# Patient Record
Sex: MALE | Employment: FULL TIME | URBAN - METROPOLITAN AREA
[De-identification: names, ages, dates, MRNs, and addresses within clinical notes are randomized per-mention and may not be internally consistent; named-entity substitution may affect disease eponyms.]

---

## 2021-02-15 ENCOUNTER — NURSE TRIAGE (OUTPATIENT)
Dept: NURSING | Facility: CLINIC | Age: 31
End: 2021-02-15

## 2021-02-15 NOTE — TELEPHONE ENCOUNTER
Abrahan calls and says that he feels fluid in his head. Pt. Says that he feels this fluid in the sides and back of his head. Pt. Says that the inside of his ears also feels wet. Pt. Says that he has no fluid coming out of his ears. Pt. Says that this pressure began last Monday. Pt. Says that he has an appointment to see a  Tomorrow but wants to go to an  clinic, too. Pt. Says that he might go to the [facility] clinic this nanci. COVID 19 Nurse Triage Plan/Patient Instructions    Please be aware that novel coronavirus (COVID-19) may be circulating in the community. If you develop symptoms such as fever, cough, or SOB or if you have concerns about the presence of another infection including coronavirus (COVID-19), please contact your health care provider or visit [email or web address].     Disposition/Instructions    Home care recommended. Follow home care protocol based instructions.    Thank you for taking steps to prevent the spread of this virus.  o Limit your contact with others.  o Wear a simple mask to cover your cough.  o Wash your hands well and often.    Resources    [facility]: About COVID-19: [email or web address]    CDC: What to Do If You're Sick: www.cdc.gov/coronavirus/2019-ncov/about/steps-when-sick.html    CDC: Ending Home Isolation: www.cdc.gov/coronavirus/2019-ncov/hcp/disposition-in-home-patients.html     CDC: Caring for Someone: www.cdc.gov/coronavirus/2019-ncov/if-you-are-sick/care-for-someone.html     [facility]: Interim Guidance for Hospital Discharge to Home: www.health.[facility].mn.us/diseases/coronavirus/hcp/hospdischarge.pdf    [facility] clinical trials (COVID-19 research studies): [email or web address]     Below are the COVID-19 hotlines at the [facility] ([facility]). Interpreters are available.   o For health questions: Call 275-594-7971 or 1-193.582.9227 (7 a.m. to 7 p.m.)  o For questions about schools and childcare: Call 669-960-7630 or  "1-196.414.1009 (7 a.m. to 7 p.m.)                     Reason for Disposition    Headache    Additional Information    Negative: Difficult to awaken or acting confused (e.g., disoriented, slurred speech)    Negative: [1] Weakness of the face, arm or leg on one side of the body AND [2] new onset    Negative: [1] Numbness of the face, arm or leg on one side of the body AND [2] new onset    Negative: [1] Loss of speech or garbled speech AND [2] new onset    Negative: Passed out (i.e., lost consciousness, collapsed and was not responding)    Negative: Sounds like a life-threatening emergency to the triager    Negative: Followed a head injury    Negative: Pregnant    Negative: Postpartum (from 0 to 6 weeks after delivery)    Negative: Traumatic Brain Injury (TBI) is suspected    Negative: Unable to walk, or can only walk with assistance (e.g., requires support)    Negative: Stiff neck (can't touch chin to chest)    Negative: Severe pain in one eye    Negative: [1] Other family members (or roommates) with headaches AND [2] possibility of carbon monoxide exposure    Negative: [1] SEVERE headache (e.g., excruciating) AND [2] \"worst headache\" of life    Negative: [1] SEVERE headache AND [2] sudden-onset (i.e., reaching maximum intensity within seconds)    Negative: [1] SEVERE headache AND [2] fever    Negative: Loss of vision or double vision (Exception: same as prior migraines)    Negative: [1] Fever > 100.0 F (37.8 C) AND [2] diabetes mellitus or weak immune system (e.g., HIV positive, cancer chemo, splenectomy, organ transplant, chronic steroids)    Negative: Patient sounds very sick or weak to the triager    Negative: [1] SEVERE headache (e.g., excruciating) AND [2] not improved after 2 hours of pain medicine    Negative: [1] Vomiting AND [2] 2 or more times (Exception: similar to previous migraines)    Negative: Fever > 104 F (40 C)    Negative: [1] MODERATE headache (e.g., interferes with normal activities) AND [2] " present > 24 hours AND [3] unexplained  (Exceptions: analgesics not tried, typical migraine, or headache part of viral illness)    Negative: [1] New headache AND [2] weak immune system (e.g., HIV positive, cancer chemo, splenectomy, organ transplant, chronic steroids)    Negative: [1] New headache AND [2] age > 50    Negative: [1] Sinus pain of forehead AND [2] yellow or green nasal discharge    Negative: Fever present > 3 days (72 hours)    Negative: [1] MILD-MODERATE headache AND [2] present > 72 hours    Negative: Headache started during sex    Negative: Headache is a chronic symptom (recurrent or ongoing AND present > 4 weeks)    Negative: Similar to previously diagnosed migraine headaches    Negative: Similar to previously diagnosed muscle-tension headaches    Negative: [1] Headache AND [2] has not taken pain medications    Negative: [1] Headache AND [2] part of viral illness AND [3] present < 72 hours    Protocols used: HEADACHE-A-AH

## 2021-02-16 ENCOUNTER — OFFICE VISIT (OUTPATIENT)
Dept: URGENT CARE | Facility: URGENT CARE | Age: 31
End: 2021-02-16
Payer: COMMERCIAL

## 2021-02-16 ENCOUNTER — APPOINTMENT (OUTPATIENT)
Dept: CT IMAGING | Facility: CLINIC | Age: 31
End: 2021-02-16
Attending: EMERGENCY MEDICINE
Payer: COMMERCIAL

## 2021-02-16 ENCOUNTER — HOSPITAL ENCOUNTER (EMERGENCY)
Facility: CLINIC | Age: 31
Discharge: HOME | End: 2021-02-17
Attending: EMERGENCY MEDICINE | Admitting: EMERGENCY MEDICINE
Payer: COMMERCIAL

## 2021-02-16 ENCOUNTER — OFFICE VISIT (OUTPATIENT)
Dept: FAMILY MEDICINE | Facility: CLINIC | Age: 31
End: 2021-02-16
Payer: COMMERCIAL

## 2021-02-16 VITALS
HEART RATE: 120 BPM | WEIGHT: 220 LBS | TEMPERATURE: 99.7 F | OXYGEN SATURATION: 97 % | BODY MASS INDEX: 34.46 KG/M2 | SYSTOLIC BLOOD PRESSURE: 131 MMHG | DIASTOLIC BLOOD PRESSURE: 83 MMHG

## 2021-02-16 VITALS
OXYGEN SATURATION: 96 % | WEIGHT: 220 LBS | SYSTOLIC BLOOD PRESSURE: 119 MMHG | HEART RATE: 110 BPM | RESPIRATION RATE: 18 BRPM | DIASTOLIC BLOOD PRESSURE: 87 MMHG | TEMPERATURE: 99.7 F | HEIGHT: 67 IN | BODY MASS INDEX: 34.53 KG/M2

## 2021-02-16 DIAGNOSIS — R00.2 PALPITATIONS: Primary | ICD-10-CM

## 2021-02-16 DIAGNOSIS — Z11.52 ENCOUNTER FOR SCREENING LABORATORY TESTING FOR SEVERE ACUTE RESPIRATORY SYNDROME CORONAVIRUS 2 (SARS-COV-2): ICD-10-CM

## 2021-02-16 DIAGNOSIS — H65.91 OTITIS MEDIA WITH EFFUSION, RIGHT: Primary | ICD-10-CM

## 2021-02-16 DIAGNOSIS — H61.22 IMPACTED CERUMEN OF LEFT EAR: ICD-10-CM

## 2021-02-16 DIAGNOSIS — R00.0 SINUS TACHYCARDIA: ICD-10-CM

## 2021-02-16 DIAGNOSIS — H92.03 EAR DISCOMFORT, BILATERAL: ICD-10-CM

## 2021-02-16 DIAGNOSIS — R06.02 SOB (SHORTNESS OF BREATH): ICD-10-CM

## 2021-02-16 LAB
ALBUMIN SERPL-MCNC: 4.6 G/DL (ref 3.4–5)
ALBUMIN UR-MCNC: NEGATIVE MG/DL
ALP SERPL-CCNC: 48 U/L (ref 40–150)
ALT SERPL W P-5'-P-CCNC: 34 U/L (ref 0–70)
AMPHETAMINES UR QL SCN: NEGATIVE
ANION GAP SERPL CALCULATED.3IONS-SCNC: 6 MMOL/L (ref 3–14)
APPEARANCE UR: CLEAR
APTT PPP: 27 SEC (ref 22–37)
AST SERPL W P-5'-P-CCNC: 23 U/L (ref 0–45)
BARBITURATES UR QL: NEGATIVE
BASOPHILS # BLD AUTO: 0 10E9/L (ref 0–0.2)
BASOPHILS NFR BLD AUTO: 0.4 %
BENZODIAZ UR QL: NEGATIVE
BILIRUB SERPL-MCNC: 0.4 MG/DL (ref 0.2–1.3)
BILIRUB UR QL STRIP: NEGATIVE
BUN SERPL-MCNC: 15 MG/DL (ref 7–30)
CALCIUM SERPL-MCNC: 9.9 MG/DL (ref 8.5–10.1)
CANNABINOIDS UR QL SCN: POSITIVE
CHLORIDE SERPL-SCNC: 108 MMOL/L (ref 94–109)
CO2 SERPL-SCNC: 27 MMOL/L (ref 20–32)
COCAINE UR QL: NEGATIVE
COLOR UR AUTO: ABNORMAL
CREAT BLD-MCNC: 0.8 MG/DL (ref 0.66–1.25)
CREAT SERPL-MCNC: 1.16 MG/DL (ref 0.66–1.25)
DIFFERENTIAL METHOD BLD: NORMAL
EOSINOPHIL # BLD AUTO: 0 10E9/L (ref 0–0.7)
EOSINOPHIL NFR BLD AUTO: 0 %
ERYTHROCYTE [DISTWIDTH] IN BLOOD BY AUTOMATED COUNT: 13 % (ref 10–15)
ETHANOL UR QL SCN: NEGATIVE
FLUAV RNA RESP QL NAA+PROBE: NEGATIVE
FLUBV RNA RESP QL NAA+PROBE: NEGATIVE
GFR SERPL CREATININE-BSD FRML MDRD: 84 ML/MIN/{1.73_M2}
GFR SERPL CREATININE-BSD FRML MDRD: >90 ML/MIN/{1.73_M2}
GLUCOSE SERPL-MCNC: 108 MG/DL (ref 70–99)
GLUCOSE UR STRIP-MCNC: NEGATIVE MG/DL
HCT VFR BLD AUTO: 44.4 % (ref 40–53)
HGB BLD-MCNC: 15 G/DL (ref 13.3–17.7)
HGB UR QL STRIP: NEGATIVE
IMM GRANULOCYTES # BLD: 0 10E9/L (ref 0–0.4)
IMM GRANULOCYTES NFR BLD: 0.4 %
INR PPP: 1.1 (ref 0.86–1.14)
INTERPRETATION ECG - MUSE: NORMAL
KETONES UR STRIP-MCNC: 5 MG/DL
LABORATORY COMMENT REPORT: NORMAL
LACTATE BLD-SCNC: 2 MMOL/L (ref 0.7–2)
LEUKOCYTE ESTERASE UR QL STRIP: NEGATIVE
LYMPHOCYTES # BLD AUTO: 1.6 10E9/L (ref 0.8–5.3)
LYMPHOCYTES NFR BLD AUTO: 19.7 %
MAGNESIUM SERPL-MCNC: 1.8 MG/DL (ref 1.6–2.3)
MCH RBC QN AUTO: 29.4 PG (ref 26.5–33)
MCHC RBC AUTO-ENTMCNC: 33.8 G/DL (ref 31.5–36.5)
MCV RBC AUTO: 87 FL (ref 78–100)
MONOCYTES # BLD AUTO: 0.5 10E9/L (ref 0–1.3)
MONOCYTES NFR BLD AUTO: 6.5 %
MUCOUS THREADS #/AREA URNS LPF: PRESENT /LPF
NEUTROPHILS # BLD AUTO: 6.1 10E9/L (ref 1.6–8.3)
NEUTROPHILS NFR BLD AUTO: 73 %
NITRATE UR QL: NEGATIVE
NRBC # BLD AUTO: 0 10*3/UL
NRBC BLD AUTO-RTO: 0 /100
NT-PROBNP SERPL-MCNC: 16 PG/ML (ref 0–450)
OPIATES UR QL SCN: NEGATIVE
PH UR STRIP: 6 PH (ref 5–7)
PLATELET # BLD AUTO: 287 10E9/L (ref 150–450)
POTASSIUM SERPL-SCNC: 3.7 MMOL/L (ref 3.4–5.3)
PROT SERPL-MCNC: 8.8 G/DL (ref 6.8–8.8)
RBC # BLD AUTO: 5.11 10E12/L (ref 4.4–5.9)
RBC #/AREA URNS AUTO: <1 /HPF (ref 0–2)
RSV RNA SPEC QL NAA+PROBE: NEGATIVE
SARS-COV-2 RNA RESP QL NAA+PROBE: NEGATIVE
SODIUM SERPL-SCNC: 140 MMOL/L (ref 133–144)
SOURCE: ABNORMAL
SP GR UR STRIP: 1.01 (ref 1–1.03)
SPECIMEN SOURCE: NORMAL
SQUAMOUS #/AREA URNS AUTO: 1 /HPF (ref 0–1)
TROPONIN I SERPL-MCNC: <0.015 UG/L (ref 0–0.04)
TROPONIN I SERPL-MCNC: <0.015 UG/L (ref 0–0.04)
TSH SERPL DL<=0.005 MIU/L-ACNC: 1.66 MU/L (ref 0.4–4)
UROBILINOGEN UR STRIP-MCNC: NORMAL MG/DL (ref 0–2)
WBC # BLD AUTO: 8.3 10E9/L (ref 4–11)
WBC #/AREA URNS AUTO: <1 /HPF (ref 0–5)

## 2021-02-16 PROCEDURE — 99202 OFFICE O/P NEW SF 15 MIN: CPT | Mod: 25 | Performed by: FAMILY MEDICINE

## 2021-02-16 PROCEDURE — 82565 ASSAY OF CREATININE: CPT

## 2021-02-16 PROCEDURE — C9803 HOPD COVID-19 SPEC COLLECT: HCPCS

## 2021-02-16 PROCEDURE — 83605 ASSAY OF LACTIC ACID: CPT | Performed by: EMERGENCY MEDICINE

## 2021-02-16 PROCEDURE — 85730 THROMBOPLASTIN TIME PARTIAL: CPT | Performed by: EMERGENCY MEDICINE

## 2021-02-16 PROCEDURE — 85025 COMPLETE CBC W/AUTO DIFF WBC: CPT | Performed by: EMERGENCY MEDICINE

## 2021-02-16 PROCEDURE — 81001 URINALYSIS AUTO W/SCOPE: CPT | Performed by: EMERGENCY MEDICINE

## 2021-02-16 PROCEDURE — 83735 ASSAY OF MAGNESIUM: CPT | Performed by: EMERGENCY MEDICINE

## 2021-02-16 PROCEDURE — 96360 HYDRATION IV INFUSION INIT: CPT | Mod: 59

## 2021-02-16 PROCEDURE — 85610 PROTHROMBIN TIME: CPT | Performed by: EMERGENCY MEDICINE

## 2021-02-16 PROCEDURE — 69210 REMOVE IMPACTED EAR WAX UNI: CPT | Mod: LT | Performed by: FAMILY MEDICINE

## 2021-02-16 PROCEDURE — 87636 SARSCOV2 & INF A&B AMP PRB: CPT | Performed by: EMERGENCY MEDICINE

## 2021-02-16 PROCEDURE — 84484 ASSAY OF TROPONIN QUANT: CPT | Performed by: EMERGENCY MEDICINE

## 2021-02-16 PROCEDURE — 93005 ELECTROCARDIOGRAM TRACING: CPT

## 2021-02-16 PROCEDURE — 80320 DRUG SCREEN QUANTALCOHOLS: CPT | Performed by: EMERGENCY MEDICINE

## 2021-02-16 PROCEDURE — 99215 OFFICE O/P EST HI 40 MIN: CPT | Performed by: PREVENTIVE MEDICINE

## 2021-02-16 PROCEDURE — 96361 HYDRATE IV INFUSION ADD-ON: CPT

## 2021-02-16 PROCEDURE — 80307 DRUG TEST PRSMV CHEM ANLYZR: CPT | Performed by: EMERGENCY MEDICINE

## 2021-02-16 PROCEDURE — 93000 ELECTROCARDIOGRAM COMPLETE: CPT | Performed by: PREVENTIVE MEDICINE

## 2021-02-16 PROCEDURE — 250N000011 HC RX IP 250 OP 636: Performed by: STUDENT IN AN ORGANIZED HEALTH CARE EDUCATION/TRAINING PROGRAM

## 2021-02-16 PROCEDURE — 83880 ASSAY OF NATRIURETIC PEPTIDE: CPT | Performed by: EMERGENCY MEDICINE

## 2021-02-16 PROCEDURE — 84443 ASSAY THYROID STIM HORMONE: CPT | Performed by: EMERGENCY MEDICINE

## 2021-02-16 PROCEDURE — 80053 COMPREHEN METABOLIC PANEL: CPT | Performed by: EMERGENCY MEDICINE

## 2021-02-16 PROCEDURE — 71275 CT ANGIOGRAPHY CHEST: CPT

## 2021-02-16 PROCEDURE — 258N000003 HC RX IP 258 OP 636: Performed by: EMERGENCY MEDICINE

## 2021-02-16 PROCEDURE — 99285 EMERGENCY DEPT VISIT HI MDM: CPT | Mod: 25 | Performed by: EMERGENCY MEDICINE

## 2021-02-16 PROCEDURE — 93010 ELECTROCARDIOGRAM REPORT: CPT | Performed by: EMERGENCY MEDICINE

## 2021-02-16 PROCEDURE — 99285 EMERGENCY DEPT VISIT HI MDM: CPT | Mod: 25

## 2021-02-16 PROCEDURE — 71275 CT ANGIOGRAPHY CHEST: CPT | Mod: 26 | Performed by: RADIOLOGY

## 2021-02-16 RX ORDER — IOPAMIDOL 755 MG/ML
71 INJECTION, SOLUTION INTRAVASCULAR ONCE
Status: COMPLETED | OUTPATIENT
Start: 2021-02-16 | End: 2021-02-16

## 2021-02-16 RX ADMIN — SODIUM CHLORIDE 1000 ML: 9 INJECTION, SOLUTION INTRAVENOUS at 20:22

## 2021-02-16 RX ADMIN — IOPAMIDOL 71 ML: 755 INJECTION, SOLUTION INTRAVENOUS at 20:46

## 2021-02-16 RX ADMIN — SODIUM CHLORIDE 1000 ML: 9 INJECTION, SOLUTION INTRAVENOUS at 23:17

## 2021-02-16 SDOH — HEALTH STABILITY: MENTAL HEALTH: HOW OFTEN DO YOU HAVE A DRINK CONTAINING ALCOHOL?: NEVER

## 2021-02-16 ASSESSMENT — MIFFLIN-ST. JEOR
SCORE: 1916.54
SCORE: 1916.54

## 2021-02-16 NOTE — PATIENT INSTRUCTIONS
Patient Education     Earache, No Infection (Adult)   Earaches can happen without an infection. They can occur when air and fluid build up behind the eardrum. They may cause a feeling of fullness and discomfort. They may also impair hearing. This is called otitis media with effusion (OME) or serous otitis media. It means there is fluid in the middle ear. It is not the same as acute otitis media, which is often from an infection.  OME can happen when you have a cold if congestion blocks the passage that drains the middle ear. This passage is called the eustachian tube. OME may also occur with nasal allergies or after a bacterial infection in the middle ear. Other causes are:    Trauma    Improper cleaning of wax from the ear    Bacterial infection of the mastoid bone (mastoiditis)    Tumor    Jaw pain    Changes in pressure, such as from flying or scuba diving    The pain or discomfort may come and go. You may hear clicking or popping sounds when you chew or swallow. You may feel that your balance is off. Or you may hear ringing in the ear.  It often takes from several weeks up to 3 months for the fluid to clear on its own. Oral pain relievers and ear drops help if there is pain. Decongestants and antihistamines sometimes help. Antibiotics don't help since there is no infection. Your healthcare provider may give you a nasal spray to help reduce swelling in the nose and eustachian tube. This can allow the ear to drain.  If your OME doesn't get better after 3 months, surgery may be used to drain the fluid. A small tube may also be put in the eardrum to help with drainage.  Because the middle ear fluid can become infected, watch for signs of an infection. These may develop later. They may include increased ear pain, fever, or drainage from the ear.  Home care  These home-care tips will help you take care of yourself:    You may use over-the-counter medicine as directed by your healthcare provider to control pain, unless  medicine was prescribed. If you have chronic liver or kidney disease or ever had a stomach ulcer or GI bleeding, talk with your healthcare provider before using any medicines.    Aspirin should never be used in anyone younger than age 18 who has a fever. It may cause severe liver damage.    Ask your healthcare provider if you may use over-the-counter decongestants such as phenylephrine or pseudoephedrine. Keep in mind they are not always helpful.    Talk with your healthcare provider about using nasal spray decongestants. Don't use them for more than 3 days, or as directed by your healthcare provider. Longer use can make congestion worse. Prescription nasal sprays from your healthcare provider don't often have such restrictions.    Antihistamines may help if you are also having allergy symptoms.    You may use medicines such as guaifenesin to thin mucus and help with drainage.  Follow-up care  Follow up with your healthcare provider or as advised if you are not feeling better after 3 days.  When to seek medical advice  Call your healthcare provider right away if any of these occur:    Ear pain that gets worse or that does not start to get better     Fever of 100.4 F (38 C) or higher, or as directed by your healthcare provider    Fluid or blood draining from the ear    Headache or sinus pain    Stiff neck    Unusual drowsiness or confusion  [employer] last reviewed this educational content on 12/1/2019 2000-2020 [employer]. [address] [place] [address], [place] [address]. All rights reserved. This information is not intended as a substitute for professional medical care. Always follow your healthcare professional's instructions.

## 2021-02-16 NOTE — PROGRESS NOTES
Assessment & Plan     Ear discomfort, bilateral  Otitis media with effusion, right  Discussed not signs of infection. Should improve with time. Will try anti-histamine with decongestant.   - loratadine-pseudoePHEDrine (CLARITIN-D 24-HOUR)  MG 24 hr tablet  Dispense: 5 tablet; Refill: 0  -Follow-up if symptoms do not improve after treatment    Impacted cerumen of left ear  - REMOVE IMPACTED CERUMEN      25 minutes spent on the date of the encounter doing chart review, history and exam, documentation and further activities as noted above  {    Kenneth William DO  [facility]    Cyndi Gauthier is a 30 year old who presents for the following health issues:    HPI     Having pressure in his ears. Does not wake up with it, but worsens throughout the day. States feeling there liquid coming out of both of his ears. Works as a . Tried one Excedrin last week without relief. Also tried mucinex without relief. Right ear bothers him more than left. Denies fever, changes in vision, blurry vision, dizziness, sob, cough, hearing loss, ringing in ears. No recent illness.     Concern - head   Onset: x 9 days   Description: pt said he feels like there is liquid in his head   Intensity: mild  Progression of Symptoms:  same  Accompanying Signs & Symptoms: popping  ears or ringing   Previous history of similar problem: None   Precipitating factors:        Worsened by: night   Alleviating factors:        Improved by: None   Therapies tried and outcome: Excedrin    Review of Systems   CONSTITUTIONAL: NEGATIVE for fever, chills, change in weight  INTEGUMENTARY/SKIN: NEGATIVE for worrisome rashes, moles or lesions  RESP: NEGATIVE for significant cough or SOB  GI: NEGATIVE for nausea, abdominal pain, heartburn, or change in bowel habits  NEURO: NEGATIVE for weakness, dizziness or paresthesias      Objective    /87 (BP Location: Left arm, Patient Position: Sitting, Cuff Size: Adult Large)    "Pulse 110   Temp 99.7  F (37.6  C) (Tympanic)   Resp 18   Ht 1.702 m (5' 7\")   Wt 99.8 kg (220 lb)   SpO2 96%   BMI 34.46 kg/m    Body mass index is 34.46 kg/m .  Physical Exam   GENERAL: healthy, alert and no distress  EYES: Eyes grossly normal to inspection, PERRL and conjunctivae and sclerae normal  HENT: right ear canal normal, left ear canal with cerumen impaction. Right TM with fluid behind TM, no erythema. Left TM normal after irrigation. , nose and mouth without ulcers or lesions  NECK: no adenopathy, no asymmetry, masses, or scars and thyroid normal to palpation  RESP: lungs clear to auscultation - no rales, rhonchi or wheezes  CV: regular rate and rhythm, normal S1 S2, no S3 or S4, no murmur, click or rub, no peripheral edema and peripheral pulses strong          "

## 2021-02-17 VITALS
RESPIRATION RATE: 16 BRPM | WEIGHT: 220 LBS | TEMPERATURE: 99.6 F | HEIGHT: 67 IN | SYSTOLIC BLOOD PRESSURE: 132 MMHG | DIASTOLIC BLOOD PRESSURE: 79 MMHG | HEART RATE: 108 BPM | BODY MASS INDEX: 34.53 KG/M2 | OXYGEN SATURATION: 94 %

## 2021-02-17 NOTE — DISCHARGE INSTRUCTIONS
Please make an appointment to follow up with Your Primary Care Provider as soon as possible if not improving.    Stay well hydrated and eat normal meals.     Return to the ED if you develop palpitations, light headedness, dizziness, chest pain, shortness of breath, numbness, weakness, fever, or any new or worsening concerns.

## 2021-02-17 NOTE — ED PROVIDER NOTES
[facility] EMERGENCY DEPARTMENT ([facility])  2/16/21  History     Chief Complaint   Patient presents with     Chest Pain     The history is provided by the patient and medical records.     Abrahan Gilbert is a 30 year old male with no significant past medical history who presents to the Emergency Department for evaluation of chest pain, and palpitations.    Per review of the patient's medical record, patient was noted to have visited [facility] clinic earlier today for bilateral otitis media with right effusion. Patient was noted to have had a pressure feeling in both ears.  Patient reportedly tried Excedrin, and Mucinex with no relief of the symptoms.  Patient was subsequently advised to try an antihistamine with decongestant (Claritin-D 24-hour  mg).  Patient then visited [facility] urgent care in [place] earlier today as well.  During this visit, patient reported he had palpitations.  Patient has also noticed intermittent shortness of breath.  At this time, denies chest pain, pleurisy.  Patient underwent EKG which showed tachycardia with a rate of 111, and rightward axis.  EKG also showed s1q3t3.  Given these findings, patient was subsequently referred to the ED for evaluation of possible MI/PE.    Upon ED evaluation, patient reports that he noticed palpitations after taking Claritin D.  He states that he took this this morning and started to notice the palpitations worsening as well as some shortness of breath.  He stated maybe his chest felt somewhat tight but no significant chest pain.  He states he did feel anxious.  He states that he is otherwise healthy and has no history of cardiac disease or arrhythmia.  He denies any family history of cardiac disease or arrhythmia.  He denies any lightheadedness or dizziness.  No syncope.  No family history of sudden death.  He states he has been having this ear pressure sensation for the past week or so.  He denies any cough or  "fevers.  Denies any loss of taste or smell.  Denies any sore throat.  He denies any dysuria or hematuria.  He denies any abdominal pain, nausea, vomiting, or diarrhea.  He states he did not eat or drink much today.  He does admit to using marijuana.  He denies any other illicit drug use.  He denies any alcohol use.  Is any leg swelling.  He denies any history of DVT or PE or family history of this.  He denies any recent surgery or travel.  He had not used Claritin-D in the past.  No other new medications or supplements.  No known COVID-19 exposure or sick contacts.    I have reviewed the Medications, Allergies, Past Medical and Surgical History, and Social History in the [facility] system.  PAST MEDICAL HISTORY: History reviewed. No pertinent past medical history.    PAST SURGICAL HISTORY: History reviewed. No pertinent surgical history.    Past medical history, past surgical history, medications, and allergies were reviewed with the patient. Additional pertinent items: None    FAMILY HISTORY: No family history on file.    SOCIAL HISTORY:   Social History     Tobacco Use     Smoking status: Never Smoker     Smokeless tobacco: Never Used   Substance Use Topics     Alcohol use: Not Currently     Frequency: Never     Social history was reviewed with the patient. Additional pertinent items: None      Discharge Medication List as of 2/17/2021 12:20 AM      CONTINUE these medications which have NOT CHANGED    Details   loratadine-pseudoePHEDrine (CLARITIN-D 24-HOUR)  MG 24 hr tablet Take 1 tablet by mouth daily for 5 days, Disp-5 tablet, R-0, E-Prescribe              No Known Allergies     Review of Systems  Pertinent positives and negatives are documented in the HPI. All other systems reviewed and are negative.      Physical Exam   BP: 127/78  Pulse: 121  Temp: 99.6  F (37.6  C)  Resp: 20  Height: 170.2 cm (5' 7\")  Weight: 99.8 kg (220 lb)  SpO2: 96 %      Physical Exam  Vitals signs and nursing note reviewed. "   Constitutional:       General: He is not in acute distress.     Appearance: He is well-developed. He is not ill-appearing.   HENT:      Head: Normocephalic and atraumatic.      Right Ear: Tympanic membrane normal.      Left Ear: Tympanic membrane normal.      Nose: Nose normal.      Comments: No pain to percussion of the sinuses     Mouth/Throat:      Mouth: Mucous membranes are moist.      Pharynx: No oropharyngeal exudate or posterior oropharyngeal erythema.   Eyes:      Extraocular Movements: Extraocular movements intact.      Conjunctiva/sclera: Conjunctivae normal.      Pupils: Pupils are equal, round, and reactive to light.   Neck:      Musculoskeletal: Normal range of motion and neck supple. No neck rigidity.   Cardiovascular:      Rate and Rhythm: Regular rhythm. Tachycardia present.      Pulses: Normal pulses.      Heart sounds: Normal heart sounds. No murmur. No friction rub. No gallop.    Pulmonary:      Effort: Pulmonary effort is normal. No respiratory distress.      Breath sounds: Normal breath sounds. No stridor. No wheezing or rales.   Abdominal:      General: Bowel sounds are normal. There is no distension.      Palpations: Abdomen is soft. There is no mass.      Tenderness: There is no abdominal tenderness. There is no guarding or rebound.   Musculoskeletal: Normal range of motion.         General: No swelling or tenderness.   Lymphadenopathy:      Cervical: No cervical adenopathy.   Skin:     General: Skin is warm and dry.      Capillary Refill: Capillary refill takes less than 2 seconds.      Findings: No rash.   Neurological:      General: No focal deficit present.      Mental Status: He is alert and oriented to person, place, and time.      GCS: GCS eye subscore is 4. GCS verbal subscore is 5. GCS motor subscore is 6.      Cranial Nerves: No cranial nerve deficit.      Sensory: No sensory deficit.      Motor: No weakness or abnormal muscle tone.      Coordination: Coordination normal.    Psychiatric:      Comments: Appears slightly anxious         ED Course   7:49 PM  The patient was seen and examined by Michelle Brandt MD in Room ED20.        Procedures             EKG Interpretation:      Interpreted by Michelle Brandt MD  Time reviewed: 7:30 pm  Symptoms at time of EKG: tachycardia   Rhythm: sinus tachycardia  Rate: Tachycardia  Axis: Normal  Ectopy: none  Conduction: normal  ST Segments/ T Waves: No evidence of acute ischemia and S1, Q3, T3  Q Waves: III  Comparison to prior: Unchanged from EKG earlier today    Clinical Impression: Sinus tachycardia. S1Q3T3. No evidence of acute ischemia. Normal intervals.                Labs Ordered and Resulted from Time of ED Arrival Up to the Time of Departure from the ED   COMPREHENSIVE METABOLIC PANEL - Abnormal; Notable for the following components:       Result Value    Glucose 108 (*)     All other components within normal limits   ROUTINE UA WITH MICROSCOPIC - Abnormal; Notable for the following components:    Ketones Urine 5 (*)     Mucous Urine Present (*)     All other components within normal limits   DRUG ABUSE SCREEN 6 CHEM DEP URINE ([facility]) - Abnormal; Notable for the following components:    Cannabinoids Qual Urine Positive (*)     All other components within normal limits   CBC WITH PLATELETS DIFFERENTIAL   INR   PARTIAL THROMBOPLASTIN TIME   TROPONIN I   NT PROBNP INPATIENT   MAGNESIUM   TSH WITH FREE T4 REFLEX   INFLUENZA A/B & SARS-COV2 PCR MULTIPLEX   LACTIC ACID WHOLE BLOOD   CREATININE POCT   TROPONIN I   ISTAT CREATININE NURSING POCT     CT Chest Pulmonary Embolism w Contrast   Final Result   IMPRESSION:    1. Limited evaluation due to streak artifact and mild cardiac motion.   No definite central pulmonary embolism. No evidence of right heart   strain.   2. No focal airspace consolidations.   3. Incidentally noted colonic diverticulosis.      I have personally reviewed the examination and initial interpretation   and I agree with  the findings.      TORI FOSTER MD          Medications   0.9% sodium chloride BOLUS (0 mLs Intravenous Stopped 2/16/21 2122)   sodium chloride (PF) 0.9% PF flush 100 mL (100 mLs Intravenous Given 2/16/21 2047)   iopamidol (ISOVUE-370) solution 71 mL (71 mLs Intravenous Given 2/16/21 2046)   0.9% sodium chloride BOLUS (0 mLs Intravenous Stopped 2/17/21 0020)             Assessments & Plan (with Medical Decision Making)   Patient presents with feeling as his heart is racing with some shortness of breath after having some ear fullness for the past week or so.  He did take Claritin-D just prior to arrival and states he felt like his symptoms began after that.  He also admits to using marijuana.  He was seen by urgent care who was concerned about his sinus tachycardia and shortness of breath so sent him here for evaluation.  On exam here, he does have sinus tachycardia with a rate of 121.  The remainder of his vital signs are within normal limits and he is afebrile.  Differential diagnosis includes but is not limited to COVID-19, other viral syndrome, medication reaction, adverse marijuana reaction, pericarditis or myocarditis, PE, acute coronary syndrome however less likely as he has no risk factors for this and no chest pain, pneumothorax, pneumonia, pulmonary edema, dehydration, thyroid dysfunction or electrolyte abnormality.  EKG was obtained here which reveals sinus tachycardia with normal intervals.  It also revealed S1Q3T3 which could illustrate concern for possible PE.  Thus we will obtain a CT of the chest to evaluate.  Concern for potential Covid and he was placed on precautions which could also increase his risk of PE.  EKG does not show any evidence of pericarditis and he has no chest pain currently to suggest this.  He apparently had some chest tightness earlier but felt he was somewhat anxious with the situation.  He does feel like he gets anxious sometimes and this could potentially be related to an  anxiety reaction as well as his marijuana use.  Laboratory studies were obtained.  White blood cell count is normal at 8.3 with a hemoglobin of 15.  We felt that serious bacterial infection was less likely and his lactic acid was within normal limits as well as would be less likely with a normal white count.  Does not have obvious sign of otitis media on my exam.  Oropharynx exam appears normal without any sign of infection.  CMP is unremarkable with normal electrolytes.  BNP is normal at 16.  Magnesium is within normal limits.  TSH is within normal limits.  Urinalysis is unremarkable with exception of some mild ketones.  He was given 2 L of IV fluid here with improvement in his heart rate to 89.  Symptomatic COVID-19 PCR was obtained which was negative for COVID-19 influenza and RSV.  Urine drug screen is positive for cannabinoids only which she admits to.  We did obtain 2 troponins which were both negative at less than 0.015.  Thus we felt that myocarditis was unlikely.  CT of the chest was unremarkable and did not reveal any evidence of PE or pneumonia.  He is satting normally on room air here.  COVID-19 is still potentially possible despite the negative test and we did discuss quarantine with the patient.  I do however feel this is somewhat less likely with his normal CT scan.  Discussed with the patient that the exact cause of his sinus tachycardia is somewhat unknown but may be multifactorial including the Claritin-D that he took today as well as his marijuana use and some dehydration.  He also felt that anxiety was playing a role.  He was feeling much improved on my reevaluation.  We discussed close outpatient follow-up with his primary care provider which she agreed with.  He was given strict return precautions to the ED and he voiced understanding.  He was comfortable with this plan.  He was discharged home in improved condition.    I have reviewed the nursing notes.    I have reviewed the findings,  diagnosis, plan and need for follow up with the patient.    Discharge Medication List as of 2/17/2021 12:20 AM          Final diagnoses:   Sinus tachycardia   Segundo LONG, am serving as a trained medical scribe to document services personally performed by Michelle Brandt MD, based on the provider's statements to me.      Michelle LONG MD, was physically present and have reviewed and verified the accuracy of this note documented by Segundo Joyner.      2/16/2021   [facility] EMERGENCY DEPARTMENT     Michelle Brandt MD  03/31/21 0943

## 2021-02-17 NOTE — PROGRESS NOTES
Assessment & Plan     Differential Diagnosis includes but is not limited to  Acute MI  Pleurisy  PE  Palpitations  Panic/Anxiety  Other    Shortness of breath  Sinus tachycardia  - EKG 12-lead complete w/read - Clinics  EKG shows tachycardia (rate 111) and rightward axis.  Also s1q3t3.  Pt needs to be evaluated for MI/PE with sob symptoms and ekg findings.  Will refer to ED for further evaluation now.       40 minutes spent on the date of the encounter doing chart review, review of test results, interpretation of tests, patient visit, documentation and discussion with other provider(s)         No follow-ups on file.    Nestor Rivers MD  [facility] URGENT CARE    Subjective     Abrahan Gilbert is a 30 year old year old male who presents to clinic today for the following health issues:    Patient presents with:  Urgent Care  Palpitations: c/o  palpitations for 1 day        HPI    This is a 29 yo man who has been having some faster heart rates over the past weeks.  He also states that he has some sob.  No chest pain or pleurisy.  Also describes some nausea and sweats.  Also has some r neck pain but thinks that is related to his otitis media diagnosed today.  He was given claritin d which he took and that has worsened his fast heart rate.  He comes here for evaluation.     Denies methamphetamine and cocaine use.  No family hx of premature cad or vte.  No hx of htn, hyperlipidemia.  No hx of smoking  No recent fracture or immobility or surgery.      Current Outpatient Medications   Medication     loratadine-pseudoePHEDrine (CLARITIN-D 24-HOUR)  MG 24 hr tablet     No current facility-administered medications for this visit.        PMH - otherwise healthy    Social History   reports that he has never smoked. He has never used smokeless tobacco. He reports previous alcohol use. He reports current drug use. Drug: Marijuana.    No family history on file.    Review of  Systems  Constitutional, HEENT, cardiovascular, pulmonary, gi and gu systems are negative, except as otherwise noted.      Objective    /83   Pulse 120   Temp 99.7  F (37.6  C) (Tympanic)   Wt 99.8 kg (220 lb)   SpO2 97%   BMI 34.46 kg/m    Physical Exam   GENERAL: healthy, alert and no distress  NECK: no adenopathy, no asymmetry, masses, or scars and thyroid normal to palpation  RESP: lungs clear to auscultation - no rales, rhonchi or wheezes  CV: regular rate and rhythm, normal S1 S2, no S3 or S4, no murmur, click or rub, no peripheral edema and peripheral pulses strong  ABDOMEN: soft, nontender, no hepatosplenomegaly, no masses and bowel sounds normal  MS: no gross musculoskeletal defects noted, no edema  SKIN: no suspicious lesions or rashes  NEURO: Normal strength and tone, mentation intact and speech normal  BACK: no CVA tenderness, no paralumbar tenderness  PSYCH: mentation appears normal, affect normal/bright    EKG - Reviewed and interpreted by me appears normal, rate 111, sinus tachycardia, rightward axis, normal intervals, T wave inversion vs strain in 3, f, no st changes, no comparison

## 2021-02-18 ENCOUNTER — TELEPHONE (OUTPATIENT)
Dept: FAMILY MEDICINE | Facility: CLINIC | Age: 31
End: 2021-02-18

## 2021-02-18 NOTE — TELEPHONE ENCOUNTER
Reason for call:  Patient reporting a symptom    Symptom or request: symptoms    Duration (how long have symptoms been present): was seen on 2-    Have you been treated for this before? Yes    Additional comments: patient said that the medication is not helping    Phone Number patient can be reached at:  Home number on file [phone] (home)    Best Time:      Can we leave a detailed message on this number:  YES    Call taken on 2/18/2021 at 2:09 PM by Chloe Barrientos

## 2021-02-18 NOTE — TELEPHONE ENCOUNTER
Pt called-states he did not take Claritin D due to cost. States it is getting better anyway.Pt states he really thinks something is wrong with him in relation to his heart, pulse feeling in his feet and a stomach ache. States he has had these symptoms for a few months. Has appointment with Dr William on Monday.   Grace Adkins RN

## 2021-03-07 ENCOUNTER — HEALTH MAINTENANCE LETTER (OUTPATIENT)
Age: 31
End: 2021-03-07

## 2021-10-11 ENCOUNTER — HEALTH MAINTENANCE LETTER (OUTPATIENT)
Age: 31
End: 2021-10-11

## 2022-03-27 ENCOUNTER — HEALTH MAINTENANCE LETTER (OUTPATIENT)
Age: 32
End: 2022-03-27

## 2022-09-25 ENCOUNTER — HEALTH MAINTENANCE LETTER (OUTPATIENT)
Age: 32
End: 2022-09-25

## 2023-05-08 ENCOUNTER — HEALTH MAINTENANCE LETTER (OUTPATIENT)
Age: 33
End: 2023-05-08